# Patient Record
(demographics unavailable — no encounter records)

---

## 2025-05-13 NOTE — PROCEDURE
[Left] : of the left [Knee] : knee [Effusion] : effusion [] : Patient tolerated procedure well [de-identified] : Procedure Name: Large Joint Injection / Aspiration: Depomedrol and Marcaine Anesthesia: ethyl chloride sprayed topically..  Depomedrol: An injection of Depomedrol 80 mg , 1 cc.  Marcaine: 5 cc.  Medication was injected in the left knee. Patient has tried OTC's including aspirin, Ibuprofen, Aleve etc or prescription NSAIDS, and/or exercises at home and/ or physical therapy without satisfactory response. After verbal consent using sterile preparation and technique. The risks, benefits, and alternatives to cortisone injection were explained in full to the patient. Risks outlined include but are not limited to infection, sepsis, bleeding, scarring, skin discoloration, temporary  increase in pain, syncopal episode, failure to resolve symptoms, allergic reaction, symptom recurrence, and elevation of blood sugar in diabetics. Patient understood the risks. All questions were answered. After discussion of options, patient requested an injection. Oral informed consent was obtained and sterile prep was done of the injection site. Sterile technique was utilized for the procedure including the preparation of the solutions used for the injection. Patient tolerated the procedure well. Advised to ice the injection site this evening. Prep with alcohol locally to site. Sterile technique used. Post Procedure Instructions: Patient was advised to call if redness, pain, or fever occur and apply ice for 15 min. out of every hour for the next 12-24 hours as tolerated.  [de-identified] : 20cc

## 2025-05-13 NOTE — DISCUSSION/SUMMARY
[de-identified] : I, Mariposa Alvares, am scribing for Dr. Jean in his presence for the chief complaint, physical exam, studies, assessment, and/or plan.

## 2025-05-13 NOTE — PHYSICAL EXAM
[Left] : left knee [] : no erythema [FreeTextEntry8] : DIFFUSE TENDERNESS [FreeTextEntry9] : WAS NOT ASSESSED.  [de-identified] : WAS NOT ASSESSED.

## 2025-05-13 NOTE — ASSESSMENT
[FreeTextEntry1] : 73 year M WITH MODERATE LT KNEE PAIN THAT STARTED IN MID APRIL 2025 AFTER HE SLIPPED. PAIN WORSENS WITH STAIRS AND WALKING PROLONGED DISTANCES. PAIN IS AFFECTING ADL AND FUNCTIONAL ACTIVITIES. XRAYS REVIEWED WITH MILD LATERAL OA, PF OA. TREATMENT OPTIONS REVIEWED. QUESTIONS ANSWERED.   MEDICATION USE DISCUSSED. PRESCRIBED MOBIC 15MG TO BE TAKEN ONCE A DAY AS NEEDED FOR PAIN, WITH CAUTION TO USE AND SIDE EFFECTS. LT KNEE CSI TODAY. PATIENT TOLERATED INJECTION WELL.

## 2025-05-13 NOTE — PHYSICAL EXAM
[Left] : left knee [] : no erythema [FreeTextEntry8] : DIFFUSE TENDERNESS [FreeTextEntry9] : WAS NOT ASSESSED.  [de-identified] : WAS NOT ASSESSED.

## 2025-05-13 NOTE — HISTORY OF PRESENT ILLNESS
[Sudden] : sudden [10] : 10 [6] : 6 [Dull/Aching] : dull/aching [Sharp] : sharp [Frequent] : frequent [Household chores] : household chores [Rest] : rest [Exercising] : exercising [de-identified] : 5.6.25 NEW PATIENT HERE FOR LEFT KLNEE PAIN. PATIENT SLIPPED CUASING PAIN IN THE KNEE WHERE THE KNEE WENT OUT. THIS HAPPENED TWO WEEKS AGO.  [] : Post Surgical Visit: no [FreeTextEntry1] : LEFT KNEE

## 2025-05-13 NOTE — HISTORY OF PRESENT ILLNESS
[Sudden] : sudden [10] : 10 [6] : 6 [Dull/Aching] : dull/aching [Sharp] : sharp [Frequent] : frequent [Household chores] : household chores [Rest] : rest [Exercising] : exercising [de-identified] : 5.6.25 NEW PATIENT HERE FOR LEFT KLNEE PAIN. PATIENT SLIPPED CUASING PAIN IN THE KNEE WHERE THE KNEE WENT OUT. THIS HAPPENED TWO WEEKS AGO.  [] : Post Surgical Visit: no [FreeTextEntry1] : LEFT KNEE

## 2025-05-13 NOTE — DISCUSSION/SUMMARY
[de-identified] : I, Mariposa Alvares, am scribing for Dr. Jean in his presence for the chief complaint, physical exam, studies, assessment, and/or plan.

## 2025-05-13 NOTE — PROCEDURE
[Left] : of the left [Knee] : knee [Effusion] : effusion [] : Patient tolerated procedure well [de-identified] : Procedure Name: Large Joint Injection / Aspiration: Depomedrol and Marcaine Anesthesia: ethyl chloride sprayed topically..  Depomedrol: An injection of Depomedrol 80 mg , 1 cc.  Marcaine: 5 cc.  Medication was injected in the left knee. Patient has tried OTC's including aspirin, Ibuprofen, Aleve etc or prescription NSAIDS, and/or exercises at home and/ or physical therapy without satisfactory response. After verbal consent using sterile preparation and technique. The risks, benefits, and alternatives to cortisone injection were explained in full to the patient. Risks outlined include but are not limited to infection, sepsis, bleeding, scarring, skin discoloration, temporary  increase in pain, syncopal episode, failure to resolve symptoms, allergic reaction, symptom recurrence, and elevation of blood sugar in diabetics. Patient understood the risks. All questions were answered. After discussion of options, patient requested an injection. Oral informed consent was obtained and sterile prep was done of the injection site. Sterile technique was utilized for the procedure including the preparation of the solutions used for the injection. Patient tolerated the procedure well. Advised to ice the injection site this evening. Prep with alcohol locally to site. Sterile technique used. Post Procedure Instructions: Patient was advised to call if redness, pain, or fever occur and apply ice for 15 min. out of every hour for the next 12-24 hours as tolerated.  [de-identified] : 20cc

## 2025-05-22 NOTE — ASSESSMENT
[FreeTextEntry1] : 73 year M WITH MODERATE LT KNEE PAIN THAT STARTED IN MID APRIL 2025 AFTER HE SLIPPED. PAIN WORSENS WITH STAIRS AND WALKING PROLONGED DISTANCES. PAIN IS AFFECTING ADL AND FUNCTIONAL ACTIVITIES. XRAYS REVIEWED WITH MILD LATERAL OA, PF OA. TREATMENT OPTIONS REVIEWED. QUESTIONS ANSWERED.   MEDICATION USE DISCUSSED. PRESCRIBED MDP TO BE TAKEN AS DIRECTED FOR PAIN, WITH CAUTION TO USE AND SIDE EFFECTS.  WILL ORDER LT KNEE MRI TO R/O SCE, STRESS FRACTURE.  [employer].

## 2025-05-22 NOTE — HISTORY OF PRESENT ILLNESS
[Sudden] : sudden [10] : 10 [Dull/Aching] : dull/aching [Sharp] : sharp [Frequent] : frequent [Household chores] : household chores [Rest] : rest [Exercising] : exercising [de-identified] : 5.6.25 NEW PATIENT HERE FOR LEFT KLNEE PAIN. PATIENT SLIPPED CUASING PAIN IN THE KNEE WHERE THE KNEE WENT OUT. THIS HAPPENED TWO WEEKS AGO.   5.22.25 PATIENT HERE FOR LEFT KNEE. PATIENT STATES THE INJECTION HELPED. 2 DAYS AGO, THE PAIN CAME BACK. [] : Post Surgical Visit: no [FreeTextEntry1] : LEFT KNEE

## 2025-05-22 NOTE — PHYSICAL EXAM
[Left] : left knee [] : no erythema [FreeTextEntry8] : DIFFUSE TENDERNESS [FreeTextEntry9] : WAS NOT ASSESSED.  [de-identified] : WAS NOT ASSESSED.

## 2025-06-10 NOTE — ASSESSMENT
[FreeTextEntry1] : 73 year M WITH MODERATE LT KNEE PAIN THAT STARTED IN MID APRIL 2025 AFTER HE SLIPPED. PAIN WORSENS WITH STAIRS AND WALKING PROLONGED DISTANCES. PAIN IS AFFECTING ADL AND FUNCTIONAL ACTIVITIES. XRAYS REVIEWED WITH MILD PF OA. TREATMENT OPTIONS REVIEWED. QUESTIONS ANSWERED.   LT KNEE MRI [facility] 5/29/25 INDEPENDENTLY REVIEWED WITH ADVANCED MEDIAL OA WITH STRESS FRACTURE TO MEDIAL TIBIAL PLATEAU, SUBCHONDRAL EDEMA TO MEDIAL FEMORAL CONDYLE.  LT KNEE MEDIAL UNLOADING BRACE WAS ADJUSTED IN OFFICE.   MEDICATION USE DISCUSSED. DISCUSSED CONTINUING MOBIC 15MG TO BE TAKEN ONCE A DAY AS NEEDED FOR PAIN, WITH CAUTION TO USE AND SIDE EFFECTS.

## 2025-06-10 NOTE — PHYSICAL EXAM
[Left] : left knee [] : no erythema [FreeTextEntry9] : WAS NOT ASSESSED.  [FreeTextEntry8] : DIFFUSE TENDERNESS [de-identified] : WAS NOT ASSESSED.

## 2025-06-10 NOTE — HISTORY OF PRESENT ILLNESS
[Sudden] : sudden [10] : 10 [Dull/Aching] : dull/aching [Sharp] : sharp [Frequent] : frequent [Household chores] : household chores [Exercising] : exercising [Rest] : rest [de-identified] : 5.6.25 NEW PATIENT HERE FOR LEFT KLNEE PAIN. PATIENT SLIPPED CUASING PAIN IN THE KNEE WHERE THE KNEE WENT OUT. THIS HAPPENED TWO WEEKS AGO.   5.22.25 PATIENT HERE FOR LEFT KNEE. PATIENT STATES THE INJECTION HELPED. 2 DAYS AGO, THE PAIN CAME BACK.  6.10.25 PATIENT HERE FOR LEFT KNEE PAIN. HERE TO REVIEW MRI [] : This patient has had an injection before: no [FreeTextEntry1] : LEFT KNEE

## 2025-06-10 NOTE — DISCUSSION/SUMMARY
[de-identified] : I, Mariposa Alvares, am scribing for Dr. Jean in his presence for the chief complaint, physical exam, studies, assessment, and/or plan.

## 2025-07-22 NOTE — ASSESSMENT
[FreeTextEntry1] : 73 year M WITH MODERATE LT KNEE PAIN THAT STARTED IN MID APRIL 2025 AFTER HE SLIPPED. PAIN WORSENS WITH STAIRS AND WALKING PROLONGED DISTANCES. PAIN IS AFFECTING ADL AND FUNCTIONAL ACTIVITIES. XRAYS REVIEWED WITH MILD PF OA. TREATMENT OPTIONS REVIEWED. QUESTIONS ANSWERED.   LT KNEE MRI [facility] 5/29/25 INDEPENDENTLY REVIEWED WITH ADVANCED MEDIAL OA WITH STRESS FRACTURE TO MEDIAL TIBIAL PLATEAU, SUBCHONDRAL EDEMA TO MEDIAL FEMORAL CONDYLE.  LT KNEE MEDIAL UNLOADING BRACE WAS ADJUSTED IN OFFICE.  XRAYS TODAY 7/22/25 REVIEWED WITH BERNA LEDBETTER. WILL CONTINUE WITH BRACE. PT IS AGAIN PRESCRIBED. WILL FOLLOW UP IN 4 WEEKS WITH REPEAT XRAYS  MEDICATION USE DISCUSSED. DISCUSSED CONTINUING MOBIC 15MG TO BE TAKEN ONCE A DAY AS NEEDED FOR PAIN, WITH CAUTION TO USE AND SIDE EFFECTS.

## 2025-07-22 NOTE — PHYSICAL EXAM
[Left] : left knee [] : no erythema [FreeTextEntry8] : DIFFUSE TENDERNESS [FreeTextEntry9] : WAS NOT ASSESSED.  [de-identified] : WAS NOT ASSESSED.

## 2025-07-22 NOTE — HISTORY OF PRESENT ILLNESS
[Sudden] : sudden [10] : 10 [Dull/Aching] : dull/aching [Sharp] : sharp [Frequent] : frequent [Household chores] : household chores [Rest] : rest [Exercising] : exercising [de-identified] : 5.6.25 NEW PATIENT HERE FOR LEFT KLNEE PAIN. PATIENT SLIPPED CUASING PAIN IN THE KNEE WHERE THE KNEE WENT OUT. THIS HAPPENED TWO WEEKS AGO.   5.22.25 PATIENT HERE FOR LEFT KNEE. PATIENT STATES THE INJECTION HELPED. 2 DAYS AGO, THE PAIN CAME BACK.  6.10.25 PATIENT HERE FOR LEFT KNEE PAIN. HERE TO REVIEW MRI  7/22/25: pATIENT IS HERE FOR FOLLWO UP OF THE LEFT KNEE. PAIN HAS BEEN BETTER WITH THE BRACE BUT WITHOUT THE BRACE THE PAIN GETS WORSE.  [] : Post Surgical Visit: no [FreeTextEntry1] : LEFT KNEE

## 2025-07-22 NOTE — DISCUSSION/SUMMARY
[de-identified] : I, Mariposa Alvares, am scribing for Dr. Jean in his presence for the chief complaint, physical exam, studies, assessment, and/or plan. 
Family